# Patient Record
Sex: FEMALE | Race: BLACK OR AFRICAN AMERICAN | NOT HISPANIC OR LATINO | URBAN - METROPOLITAN AREA
[De-identification: names, ages, dates, MRNs, and addresses within clinical notes are randomized per-mention and may not be internally consistent; named-entity substitution may affect disease eponyms.]

---

## 2021-08-22 ENCOUNTER — EMERGENCY (EMERGENCY)
Facility: HOSPITAL | Age: 21
LOS: 0 days | Discharge: HOME | End: 2021-08-22
Attending: STUDENT IN AN ORGANIZED HEALTH CARE EDUCATION/TRAINING PROGRAM
Payer: COMMERCIAL

## 2021-08-22 VITALS
HEART RATE: 116 BPM | HEIGHT: 67 IN | WEIGHT: 130.07 LBS | SYSTOLIC BLOOD PRESSURE: 110 MMHG | OXYGEN SATURATION: 95 % | TEMPERATURE: 101 F | DIASTOLIC BLOOD PRESSURE: 74 MMHG | RESPIRATION RATE: 20 BRPM

## 2021-08-22 VITALS
RESPIRATION RATE: 18 BRPM | SYSTOLIC BLOOD PRESSURE: 99 MMHG | DIASTOLIC BLOOD PRESSURE: 60 MMHG | TEMPERATURE: 100 F | HEART RATE: 78 BPM | OXYGEN SATURATION: 98 %

## 2021-08-22 DIAGNOSIS — R07.9 CHEST PAIN, UNSPECIFIED: ICD-10-CM

## 2021-08-22 DIAGNOSIS — U07.1 COVID-19: ICD-10-CM

## 2021-08-22 LAB
ALBUMIN SERPL ELPH-MCNC: 3.5 G/DL — SIGNIFICANT CHANGE UP (ref 3.3–5)
ALP SERPL-CCNC: 46 U/L — SIGNIFICANT CHANGE UP (ref 40–120)
ALT FLD-CCNC: 14 U/L — SIGNIFICANT CHANGE UP (ref 12–78)
ANION GAP SERPL CALC-SCNC: 12 MMOL/L — SIGNIFICANT CHANGE UP (ref 5–17)
ANISOCYTOSIS BLD QL: SLIGHT — SIGNIFICANT CHANGE UP
AST SERPL-CCNC: 15 U/L — SIGNIFICANT CHANGE UP (ref 15–37)
BASOPHILS # BLD AUTO: 0 K/UL — SIGNIFICANT CHANGE UP (ref 0–0.2)
BASOPHILS NFR BLD AUTO: 0 % — SIGNIFICANT CHANGE UP (ref 0–2)
BILIRUB SERPL-MCNC: 0.7 MG/DL — SIGNIFICANT CHANGE UP (ref 0.2–1.2)
BUN SERPL-MCNC: 10 MG/DL — SIGNIFICANT CHANGE UP (ref 7–23)
CALCIUM SERPL-MCNC: 8.3 MG/DL — LOW (ref 8.5–10.1)
CHLORIDE SERPL-SCNC: 103 MMOL/L — SIGNIFICANT CHANGE UP (ref 96–108)
CO2 SERPL-SCNC: 22 MMOL/L — SIGNIFICANT CHANGE UP (ref 22–31)
CREAT SERPL-MCNC: 0.88 MG/DL — SIGNIFICANT CHANGE UP (ref 0.5–1.3)
EOSINOPHIL # BLD AUTO: 0 K/UL — SIGNIFICANT CHANGE UP (ref 0–0.5)
EOSINOPHIL NFR BLD AUTO: 0 % — SIGNIFICANT CHANGE UP (ref 0–6)
FLUAV AG NPH QL: SIGNIFICANT CHANGE UP
FLUBV AG NPH QL: SIGNIFICANT CHANGE UP
GLUCOSE SERPL-MCNC: 81 MG/DL — SIGNIFICANT CHANGE UP (ref 70–99)
HCG SERPL-ACNC: <1 MIU/ML — SIGNIFICANT CHANGE UP
HCT VFR BLD CALC: 36 % — SIGNIFICANT CHANGE UP (ref 34.5–45)
HGB BLD-MCNC: 11.5 G/DL — SIGNIFICANT CHANGE UP (ref 11.5–15.5)
HYPOCHROMIA BLD QL: SLIGHT — SIGNIFICANT CHANGE UP
LYMPHOCYTES # BLD AUTO: 0.77 K/UL — LOW (ref 1–3.3)
LYMPHOCYTES # BLD AUTO: 23 % — SIGNIFICANT CHANGE UP (ref 13–44)
MANUAL SMEAR VERIFICATION: SIGNIFICANT CHANGE UP
MCHC RBC-ENTMCNC: 25.4 PG — LOW (ref 27–34)
MCHC RBC-ENTMCNC: 31.9 GM/DL — LOW (ref 32–36)
MCV RBC AUTO: 79.6 FL — LOW (ref 80–100)
MICROCYTES BLD QL: SLIGHT — SIGNIFICANT CHANGE UP
MONOCYTES # BLD AUTO: 0.27 K/UL — SIGNIFICANT CHANGE UP (ref 0–0.9)
MONOCYTES NFR BLD AUTO: 8 % — SIGNIFICANT CHANGE UP (ref 2–14)
NEUTROPHILS # BLD AUTO: 2.32 K/UL — SIGNIFICANT CHANGE UP (ref 1.8–7.4)
NEUTROPHILS NFR BLD AUTO: 67 % — SIGNIFICANT CHANGE UP (ref 43–77)
NEUTS BAND # BLD: 2 % — SIGNIFICANT CHANGE UP (ref 0–8)
NRBC # BLD: 0 /100 — SIGNIFICANT CHANGE UP (ref 0–0)
NRBC # BLD: SIGNIFICANT CHANGE UP /100 WBCS (ref 0–0)
PLAT MORPH BLD: NORMAL — SIGNIFICANT CHANGE UP
PLATELET # BLD AUTO: 198 K/UL — SIGNIFICANT CHANGE UP (ref 150–400)
PLATELET CLUMP BLD QL SMEAR: ABNORMAL
POTASSIUM SERPL-MCNC: 3.5 MMOL/L — SIGNIFICANT CHANGE UP (ref 3.5–5.3)
POTASSIUM SERPL-SCNC: 3.5 MMOL/L — SIGNIFICANT CHANGE UP (ref 3.5–5.3)
PROT SERPL-MCNC: 7.9 GM/DL — SIGNIFICANT CHANGE UP (ref 6–8.3)
RBC # BLD: 4.52 M/UL — SIGNIFICANT CHANGE UP (ref 3.8–5.2)
RBC # FLD: 17.2 % — HIGH (ref 10.3–14.5)
RBC BLD AUTO: ABNORMAL
SARS-COV-2 RNA SPEC QL NAA+PROBE: DETECTED
SODIUM SERPL-SCNC: 137 MMOL/L — SIGNIFICANT CHANGE UP (ref 135–145)
TROPONIN I SERPL-MCNC: <.015 NG/ML — SIGNIFICANT CHANGE UP (ref 0.01–0.04)
WBC # BLD: 3.36 K/UL — LOW (ref 3.8–10.5)
WBC # FLD AUTO: 3.36 K/UL — LOW (ref 3.8–10.5)

## 2021-08-22 PROCEDURE — 93010 ELECTROCARDIOGRAM REPORT: CPT

## 2021-08-22 PROCEDURE — 71045 X-RAY EXAM CHEST 1 VIEW: CPT | Mod: 26

## 2021-08-22 PROCEDURE — 99285 EMERGENCY DEPT VISIT HI MDM: CPT

## 2021-08-22 RX ORDER — SODIUM CHLORIDE 9 MG/ML
1000 INJECTION, SOLUTION INTRAVENOUS ONCE
Refills: 0 | Status: COMPLETED | OUTPATIENT
Start: 2021-08-22 | End: 2021-08-22

## 2021-08-22 RX ORDER — ACETAMINOPHEN 500 MG
650 TABLET ORAL ONCE
Refills: 0 | Status: COMPLETED | OUTPATIENT
Start: 2021-08-22 | End: 2021-08-22

## 2021-08-22 RX ORDER — IBUPROFEN 200 MG
600 TABLET ORAL ONCE
Refills: 0 | Status: COMPLETED | OUTPATIENT
Start: 2021-08-22 | End: 2021-08-22

## 2021-08-22 RX ADMIN — Medication 600 MILLIGRAM(S): at 04:27

## 2021-08-22 RX ADMIN — Medication 650 MILLIGRAM(S): at 02:51

## 2021-08-22 RX ADMIN — Medication 650 MILLIGRAM(S): at 03:20

## 2021-08-22 RX ADMIN — SODIUM CHLORIDE 1000 MILLILITER(S): 9 INJECTION, SOLUTION INTRAVENOUS at 02:51

## 2021-08-22 NOTE — ED PROVIDER NOTE - PROGRESS NOTE DETAILS
covid (+), hemodynamically stable. educated on home treatment. I have discussed with the patient about the ED workup, lab results, diagnostics results, plan for discharge home, need for follow-up with primary care physician/specialists, and return precautions. At this time, the patient does not require further workup in the ED. The patient is subjectively feeling better and would like to be discharged home. The patient had the opportunity to ask questions and I have answered all inquiries. The patient verbalizes understanding and agreement with the plan. The patient is hemodynamically stable, clinically well-appearing, ambulatory, mentating well and ready for discharge home.

## 2021-08-22 NOTE — ED PROVIDER NOTE - CLINICAL SUMMARY MEDICAL DECISION MAKING FREE TEXT BOX
The patient does not have high-risk features of a life-threatening URI infection (COVID or otherwise).  There is no severe shortness of breath, light-headedness, or inability to perform that would indicated impending respiratory failure.  I have had a long conversation with the patient regarding the need for return to the ED:  worsening cough, shortness of breath, syncope, near-syncope, or any other concerns.  However, COVID swabs were sent given age and multiple risk factors, as well as high risk exposure. The patient is hemodynamically stable, mentating well, ambulatory w/o dyspnea, saturating >95% on RA and ready for d/c home.

## 2021-08-22 NOTE — ED ADULT NURSE NOTE - NSIMPLEMENTINTERV_GEN_ALL_ED
Implemented All Universal Safety Interventions:  [place] to call system. Call bell, personal items and telephone within reach. Instruct patient to call for assistance. Room bathroom lighting operational. Non-slip footwear when patient is off stretcher. Physically safe environment: no spills, clutter or unnecessary equipment. Stretcher in lowest position, wheels locked, appropriate side rails in place.

## 2021-08-22 NOTE — ED PROVIDER NOTE - PHYSICAL EXAMINATION
VITAL SIGNS: I have reviewed nursing notes and confirm.   GEN: Well-developed; well-nourished; in no acute distress. Speaking full sentences.  SKIN: Warm, pink, no rash, no diaphoresis, no cyanosis, well perfused.   HEAD: Normocephalic; atraumatic. No scalp lacerations, no abrasions.  NECK: Supple; non tender.   EYES: Pupils 3mm equal, round, reactive to light and accomodation, conjunctiva and sclera clear. Extra-ocular movements intact bilaterally.  ENT: No nasal discharge; airway clear. Trachea is midline. ORAL: No oropharyngeal exudates or erythema. Normal dentition.  CV: (+) Tachcyardia, S1, S2 normal; no murmurs, gallops, or rubs. No lower extremity pitting edema bilaterally. Capillary refill < 2 seconds throughout. Distal pulses intact 2+ throughout.  RESP: CTA bilaterally. No wheezes, rales, or rhonchi.   ABD: Normal bowel sounds, soft, non-distended, non-tender, no hepatosplenomegaly. No CVA tenderness bilaterally.  MSK: Normal range of motion and movement of all 4 extremities. No joint or muscular pain throughout. No clubbing.   BACK: No thoracolumbar midline or paravertebral tenderness. No step-offs or obvious deformities.  NEURO: Alert & oriented x 3, Grossly unremarkable. Sensory and motor intact throughout. No focal deficits. Gait: Fluid. Normal speech and coordination.   PSYCH: Cooperative, appropriate.

## 2021-08-22 NOTE — ED PROVIDER NOTE - OBJECTIVE STATEMENT
20F no pmhx presenting with chest pain x 2 days. Described as mid sternal chest wall pain, associated w/ non-productive cough, subjective fevers, nausea but no vomiting. Denies any shortness of breath, falls, trauma, syncope, headaches, neck pain, neck stiffness, abdominal pain, sick contacts, COVID vaccination, recent travel, recent surgery, immobility, extremity swelling, hemoptysis, hormone use, known personal cancer history, or personal/family history of blood clots.

## 2021-08-22 NOTE — ED ADULT NURSE NOTE - OBJECTIVE STATEMENT
Pt presents to the ED co chest pain, fevers. Pt states this has been going on for a few days. Denies sick contacts, pregnancy, and covid vaccine.

## 2021-08-22 NOTE — ED PROVIDER NOTE - PATIENT PORTAL LINK FT
You can access the FollowMyHealth Patient Portal offered by [facility] by registering at the following website: [email or web address]. By joining [employer]’s FollowMyHealth portal, you will also be able to view your health information using other applications (apps) compatible with our system.

## 2021-10-08 NOTE — ED PROVIDER NOTE - NSFOLLOWUPINSTRUCTIONS_ED_ALL_ED_FT
No fried, spicy or greasy foods. Increase fluids as tolerated  If your doctor prescribed narcotic pain medications after your procedure to help prevent and reduce constipation, increase fluid intake and fiber intake in your diet. You may take OTC Stool Softeners such as Colace to help reduce constipation.    DO NOT TAKE ANYTHING CONTAINING TYLENOL UNTIL AFTER 8:30PM    DO NOT TAKE CONTAINING ADVIL, MOTRIN, ALEVE OR NAPROXEN UNTIL AFTER 9PM
Rest, drink plenty of fluids.  Advance activity as tolerated.  Continue all previously prescribed medications as directed.  Follow up with your PMD 2-3 days and bring copies of your results.  Return to the ER for worsening symptoms, chest pain, shortness of breath, fainting, abdominal pain, or new concerning symptoms.    Take acetaminophen 650 mg orally every 6-8 hours for pain control as needed. Please do not exceed 4,000 mg of acetaminophen during a 24 hours period. Acetaminophen can be found in many over-the-counter cold medications as well as opioid medications that may be given for pain.    Take ibuprofen (also known as MOTRIN or ADVIL) 400 mg orally every 6-8 hours for pain control as needed with food to avoid an upset stomach. Ibuprofen can be found in many over-the-counter medications. Please do not take ibuprofen if you have a bleeding disorder, stomach or gastrointestinal ulcer, or liver disease.    If needed, you can alternate these medications so that you can take one medication every 3 hours. For example, at noon take ibuprofen, then at 3PM take acetaminophen, then at 6PM take ibuprofen.

## 2023-09-03 ENCOUNTER — EMERGENCY (EMERGENCY)
Facility: HOSPITAL | Age: 23
LOS: 0 days | Discharge: HOME | End: 2023-09-03
Attending: STUDENT IN AN ORGANIZED HEALTH CARE EDUCATION/TRAINING PROGRAM
Payer: COMMERCIAL

## 2023-09-03 VITALS
DIASTOLIC BLOOD PRESSURE: 70 MMHG | TEMPERATURE: 98 F | HEART RATE: 71 BPM | RESPIRATION RATE: 18 BRPM | SYSTOLIC BLOOD PRESSURE: 107 MMHG | OXYGEN SATURATION: 100 %

## 2023-09-03 VITALS
SYSTOLIC BLOOD PRESSURE: 118 MMHG | OXYGEN SATURATION: 98 % | HEART RATE: 100 BPM | WEIGHT: 130.07 LBS | TEMPERATURE: 98 F | DIASTOLIC BLOOD PRESSURE: 77 MMHG | RESPIRATION RATE: 20 BRPM | HEIGHT: 67 IN

## 2023-09-03 DIAGNOSIS — F41.0 PANIC DISORDER [EPISODIC PAROXYSMAL ANXIETY]: ICD-10-CM

## 2023-09-03 DIAGNOSIS — R11.2 NAUSEA WITH VOMITING, UNSPECIFIED: ICD-10-CM

## 2023-09-03 DIAGNOSIS — R10.30 LOWER ABDOMINAL PAIN, UNSPECIFIED: ICD-10-CM

## 2023-09-03 DIAGNOSIS — R10.9 UNSPECIFIED ABDOMINAL PAIN: ICD-10-CM

## 2023-09-03 LAB
ALBUMIN SERPL ELPH-MCNC: 3.9 G/DL — SIGNIFICANT CHANGE UP (ref 3.3–5)
ALP SERPL-CCNC: 61 U/L — SIGNIFICANT CHANGE UP (ref 40–120)
ALT FLD-CCNC: 32 U/L — SIGNIFICANT CHANGE UP (ref 12–78)
ANION GAP SERPL CALC-SCNC: 6 MMOL/L — SIGNIFICANT CHANGE UP (ref 5–17)
APPEARANCE UR: CLEAR — SIGNIFICANT CHANGE UP
AST SERPL-CCNC: 26 U/L — SIGNIFICANT CHANGE UP (ref 15–37)
BACTERIA # UR AUTO: ABNORMAL
BASOPHILS # BLD AUTO: 0.03 K/UL — SIGNIFICANT CHANGE UP (ref 0–0.2)
BASOPHILS NFR BLD AUTO: 0.3 % — SIGNIFICANT CHANGE UP (ref 0–2)
BILIRUB SERPL-MCNC: 0.8 MG/DL — SIGNIFICANT CHANGE UP (ref 0.2–1.2)
BILIRUB UR-MCNC: NEGATIVE — SIGNIFICANT CHANGE UP
BUN SERPL-MCNC: 9 MG/DL — SIGNIFICANT CHANGE UP (ref 7–23)
CALCIUM SERPL-MCNC: 9.4 MG/DL — SIGNIFICANT CHANGE UP (ref 8.5–10.1)
CHLORIDE SERPL-SCNC: 105 MMOL/L — SIGNIFICANT CHANGE UP (ref 96–108)
CO2 SERPL-SCNC: 28 MMOL/L — SIGNIFICANT CHANGE UP (ref 22–31)
COLOR SPEC: YELLOW — SIGNIFICANT CHANGE UP
CREAT SERPL-MCNC: 0.82 MG/DL — SIGNIFICANT CHANGE UP (ref 0.5–1.3)
DIFF PNL FLD: NEGATIVE — SIGNIFICANT CHANGE UP
EGFR: 104 ML/MIN/1.73M2 — SIGNIFICANT CHANGE UP
EOSINOPHIL # BLD AUTO: 0.01 K/UL — SIGNIFICANT CHANGE UP (ref 0–0.5)
EOSINOPHIL NFR BLD AUTO: 0.1 % — SIGNIFICANT CHANGE UP (ref 0–6)
EPI CELLS # UR: SIGNIFICANT CHANGE UP
GLUCOSE SERPL-MCNC: 93 MG/DL — SIGNIFICANT CHANGE UP (ref 70–99)
GLUCOSE UR QL: NEGATIVE MG/DL — SIGNIFICANT CHANGE UP
HCG SERPL-ACNC: <1 MIU/ML — SIGNIFICANT CHANGE UP
HCT VFR BLD CALC: 39.1 % — SIGNIFICANT CHANGE UP (ref 34.5–45)
HGB BLD-MCNC: 12.4 G/DL — SIGNIFICANT CHANGE UP (ref 11.5–15.5)
IMM GRANULOCYTES NFR BLD AUTO: 0.4 % — SIGNIFICANT CHANGE UP (ref 0–0.9)
KETONES UR-MCNC: NEGATIVE — SIGNIFICANT CHANGE UP
LEUKOCYTE ESTERASE UR-ACNC: ABNORMAL
LIDOCAIN IGE QN: 36 U/L — SIGNIFICANT CHANGE UP (ref 13–75)
LYMPHOCYTES # BLD AUTO: 1.15 K/UL — SIGNIFICANT CHANGE UP (ref 1–3.3)
LYMPHOCYTES # BLD AUTO: 11.8 % — LOW (ref 13–44)
MCHC RBC-ENTMCNC: 26.7 PG — LOW (ref 27–34)
MCHC RBC-ENTMCNC: 31.7 G/DL — LOW (ref 32–36)
MCV RBC AUTO: 84.1 FL — SIGNIFICANT CHANGE UP (ref 80–100)
MONOCYTES # BLD AUTO: 0.33 K/UL — SIGNIFICANT CHANGE UP (ref 0–0.9)
MONOCYTES NFR BLD AUTO: 3.4 % — SIGNIFICANT CHANGE UP (ref 2–14)
NEUTROPHILS # BLD AUTO: 8.18 K/UL — HIGH (ref 1.8–7.4)
NEUTROPHILS NFR BLD AUTO: 84 % — HIGH (ref 43–77)
NITRITE UR-MCNC: NEGATIVE — SIGNIFICANT CHANGE UP
NRBC # BLD: 0 /100 WBCS — SIGNIFICANT CHANGE UP (ref 0–0)
PH UR: 8 — SIGNIFICANT CHANGE UP (ref 5–8)
PLATELET # BLD AUTO: 238 K/UL — SIGNIFICANT CHANGE UP (ref 150–400)
POTASSIUM SERPL-MCNC: 3.8 MMOL/L — SIGNIFICANT CHANGE UP (ref 3.5–5.3)
POTASSIUM SERPL-SCNC: 3.8 MMOL/L — SIGNIFICANT CHANGE UP (ref 3.5–5.3)
PROT SERPL-MCNC: 8.1 GM/DL — SIGNIFICANT CHANGE UP (ref 6–8.3)
PROT UR-MCNC: NEGATIVE MG/DL — SIGNIFICANT CHANGE UP
RBC # BLD: 4.65 M/UL — SIGNIFICANT CHANGE UP (ref 3.8–5.2)
RBC # FLD: 16.8 % — HIGH (ref 10.3–14.5)
RBC CASTS # UR COMP ASSIST: SIGNIFICANT CHANGE UP /HPF (ref 0–4)
SODIUM SERPL-SCNC: 139 MMOL/L — SIGNIFICANT CHANGE UP (ref 135–145)
SP GR SPEC: 1.01 — SIGNIFICANT CHANGE UP (ref 1.01–1.02)
UROBILINOGEN FLD QL: NEGATIVE MG/DL — SIGNIFICANT CHANGE UP
WBC # BLD: 9.74 K/UL — SIGNIFICANT CHANGE UP (ref 3.8–10.5)
WBC # FLD AUTO: 9.74 K/UL — SIGNIFICANT CHANGE UP (ref 3.8–10.5)
WBC UR QL: SIGNIFICANT CHANGE UP

## 2023-09-03 PROCEDURE — 74177 CT ABD & PELVIS W/CONTRAST: CPT | Mod: 26,MA

## 2023-09-03 PROCEDURE — 99285 EMERGENCY DEPT VISIT HI MDM: CPT

## 2023-09-03 RX ORDER — SODIUM CHLORIDE 9 MG/ML
1000 INJECTION INTRAMUSCULAR; INTRAVENOUS; SUBCUTANEOUS ONCE
Refills: 0 | Status: COMPLETED | OUTPATIENT
Start: 2023-09-03 | End: 2023-09-03

## 2023-09-03 RX ORDER — ACETAMINOPHEN 500 MG
975 TABLET ORAL ONCE
Refills: 0 | Status: COMPLETED | OUTPATIENT
Start: 2023-09-03 | End: 2023-09-03

## 2023-09-03 RX ORDER — IOHEXOL 300 MG/ML
30 INJECTION, SOLUTION INTRAVENOUS ONCE
Refills: 0 | Status: COMPLETED | OUTPATIENT
Start: 2023-09-03 | End: 2023-09-03

## 2023-09-03 RX ADMIN — SODIUM CHLORIDE 1000 MILLILITER(S): 9 INJECTION INTRAMUSCULAR; INTRAVENOUS; SUBCUTANEOUS at 18:43

## 2023-09-03 RX ADMIN — Medication 975 MILLIGRAM(S): at 18:42

## 2023-09-03 RX ADMIN — IOHEXOL 30 MILLILITER(S): 300 INJECTION, SOLUTION INTRAVENOUS at 18:42

## 2023-09-03 NOTE — ED PROVIDER NOTE - PATIENT PORTAL LINK FT
You can access the FollowMyHealth Patient Portal offered by [facility] by registering at the following website: [email or web address]. By joining [employer]’s FollowMyHealth portal, you will also be able to view your health information using other applications (apps) compatible with our system.

## 2023-09-03 NOTE — ED PROVIDER NOTE - OBJECTIVE STATEMENT
22-year-old female with no reported significant past medical history presenting to the ED with multiple medical complaints, reports having a panic attack felt very anxious also reports abdominal pain and nausea and vomiting earlier today, patient denies any current symptoms of chest pain shortness of breath, no other reported complaints..

## 2023-09-03 NOTE — ED ADULT TRIAGE NOTE - CHIEF COMPLAINT QUOTE
pt states " I'm having a panic attack. " pt is shaking at triage. very anxious. c/o shortness of breath, headache and stiffness in her hands. history of anxiety.

## 2023-09-03 NOTE — ED ADULT NURSE NOTE - NSFALLUNIVINTERV_ED_ALL_ED
Bed/Stretcher in lowest position, wheels locked, appropriate side rails in place/Call bell, personal items and telephone in reach/Instruct patient to call for assistance before getting out of bed/chair/stretcher/Non-slip footwear applied when patient is off stretcher/[place] to call system/Physically safe environment - no spills, clutter or unnecessary equipment/Purposeful proactive rounding/Room/bathroom lighting operational, light cord in reach

## 2023-09-03 NOTE — ED PROVIDER NOTE - NSFOLLOWUPCLINICS_GEN_ALL_ED_FT
[facility]  Psychiatry  [address]  [place] [address]  Phone: [phone]  Fax:   Follow Up Time: 7-10 Days    [facility]  Psychiatry  [address] [place] [address]  Phone: [phone]  Fax:   Follow Up Time: 7-10 Days

## 2023-09-03 NOTE — ED PROVIDER NOTE - CLINICAL SUMMARY MEDICAL DECISION MAKING FREE TEXT BOX
22-year-old female with no reported significant past medical history presenting to the ED with multiple medical complaints, reports having a panic attack felt very anxious also reports abdominal pain and nausea and vomiting earlier today, patient denies any current symptoms of chest pain shortness of breath, no other reported complaints..    ct abd/pelvis, labs, ua 22-year-old female with no reported significant past medical history presenting to the ED with multiple medical complaints, reports having a panic attack felt very anxious also reports abdominal pain and nausea and vomiting earlier today, patient denies any current symptoms of chest pain shortness of breath, no other reported complaints..    ct abd/pelvis, labs, ua  no acute pathology  f/u psychiatry

## 2023-09-03 NOTE — ED ADULT NURSE NOTE - OBJECTIVE STATEMENT
patient A&Ox3 in no acute distress c/o of headache with nausea and vomiting for 3 days , zeeant cough no fever at this time , no chest pain no difficulty breathing, patient also c/o of anxiety

## 2023-09-03 NOTE — ED PROVIDER NOTE - PHYSICAL EXAMINATION
VITAL SIGNS: I have reviewed nursing notes and confirm.  CONSTITUTIONAL: well-appearing, non-toxic, NAD  SKIN: Warm dry, normal skin turgor  HEAD: NCAT  EYES: EOMI, PERRLA, no scleral icterus  ENT: Moist mucous membranes, normal pharynx with no erythema or exudates  NECK: Supple; non tender. Full ROM. No cervical LAD  CARD: RRR, no murmurs, rubs or gallops  RESP: clear to ausculation b/l.  No rales, rhonchi, or wheezing.  ABD: soft, + BS lower abdominal ttp non-distended, no rebound or guarding. No CVA tenderness  EXT: Full ROM, no bony tenderness, no pedal edema, no calf tenderness  PSYCH: Cooperative, appropriate.

## 2024-09-18 ENCOUNTER — EMERGENCY (EMERGENCY)
Facility: HOSPITAL | Age: 24
LOS: 0 days | Discharge: HOME | End: 2024-09-18
Attending: EMERGENCY MEDICINE
Payer: COMMERCIAL

## 2024-09-18 VITALS
TEMPERATURE: 98 F | OXYGEN SATURATION: 100 % | DIASTOLIC BLOOD PRESSURE: 75 MMHG | WEIGHT: 145.06 LBS | HEART RATE: 89 BPM | SYSTOLIC BLOOD PRESSURE: 110 MMHG | HEIGHT: 67 IN | RESPIRATION RATE: 16 BRPM

## 2024-09-18 VITALS
HEART RATE: 72 BPM | RESPIRATION RATE: 18 BRPM | DIASTOLIC BLOOD PRESSURE: 79 MMHG | TEMPERATURE: 98 F | OXYGEN SATURATION: 100 % | SYSTOLIC BLOOD PRESSURE: 118 MMHG
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
## 2024-09-18 DIAGNOSIS — M54.50 LOW BACK PAIN, UNSPECIFIED: ICD-10-CM
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
## 2024-09-18 DIAGNOSIS — M54.6 PAIN IN THORACIC SPINE: ICD-10-CM

## 2024-09-18 PROBLEM — F41.9 ANXIETY DISORDER, UNSPECIFIED: Chronic | Status: ACTIVE | Noted: 2023-09-03

## 2024-09-18 LAB — HCG UR QL: NEGATIVE — SIGNIFICANT CHANGE UP

## 2024-09-18 PROCEDURE — 99284 EMERGENCY DEPT VISIT MOD MDM: CPT

## 2024-09-18 PROCEDURE — 72020 X-RAY EXAM OF SPINE 1 VIEW: CPT | Mod: 26

## 2024-09-18 PROCEDURE — 71046 X-RAY EXAM CHEST 2 VIEWS: CPT | Mod: 26

## 2024-09-18 RX ORDER — IBUPROFEN 600 MG
600 TABLET ORAL ONCE
Refills: 0 | Status: COMPLETED | OUTPATIENT
Start: 2024-09-18 | End: 2024-09-18

## 2024-09-18 RX ADMIN — Medication 600 MILLIGRAM(S): at 22:09

## 2024-09-18 NOTE — ED PROVIDER NOTE - PROGRESS NOTE DETAILS
MARITA Patel: Workup reviewed - XRAY's w/ no acute pathology noted. Results endorsed including unexpected incidental findings (copy of reports provided to patient). Shared Decision Making - Reassessment performed, pt able to ambulate in ED without any difficulty, pt requesting Motrin since she was initially offered Tylenol but didn't want it at that time. Patient is medically stable for discharge. Strict return precautions given, discussed red flag signs/symptoms. Patient to follow up with PMD, Ortho prn. Patient/parent displays understanding and agreeable with plan, comfortable with discharge plan home. Plan for discharge discussed with Dr. Mancilla who agrees with disposition and discharge plan.
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
## 2024-09-18 NOTE — ED PROVIDER NOTE - NSFOLLOWUPINSTRUCTIONS_ED_ALL_ED_FT
- Follow-up with Orthopedics as needed for persistent back pain.  - Follow-up with your Primary Care Physician within the next week.    Medications  - Take Tylenol (Acetaminophen) 650 mg every 4-6 hours AND/OR Motrin (Ibuprofen) 600 mg every 6-8 hours as needed for pain/fever.    Advance activity as tolerated.  Continue all previously prescribed medications as directed unless otherwise instructed.  Follow up with your primary care physician in 48-72 hours- bring copies of your results.  Return to the ER for worsening or persistent symptoms, and/or ANY NEW OR CONCERNING SYMPTOMS such as fever, chest pain, shortness of breath, abdominal pain, or headaches. If you have issues obtaining follow up, please call: [phone] to obtain a doctor or specialist who takes your insurance in your area.  You may call [phone] to make an appointment with the internal medicine clinic.    Acute Back Pain, Adult    Acute back pain is sudden and usually short-lived. It is often caused by an injury to the muscles and tissues in the back. The injury may result from:    A muscle or ligament getting overstretched or torn (strained). Ligaments are tissues that connect bones to each other. Lifting something improperly can cause a back strain.  Wear and tear (degeneration) of the spinal disks. Spinal disks are circular tissue that provide cushioning between the bones of the spine (vertebrae).  Twisting motions, such as while playing sports or doing yard work.  A hit to the back.  Arthritis.    You may have a physical exam, lab tests, and imaging tests to find the cause of your pain. Acute back pain usually goes away with rest and home care.    Follow these instructions at home:      Managing pain, stiffness, and swelling    Treatment may include medicines for pain and inflammation that are taken by mouth or applied to the skin, prescription pain medicine, or muscle relaxants. Take over-the-counter and prescription medicines only as told by your health care provider.  Your health care provider may recommend applying ice during the first 24–48 hours after your pain starts. To do this:    Put ice in a plastic bag.  Place a towel between your skin and the bag.  Leave the ice on for 20 minutes, 2–3 times a day.  If directed, apply heat to the affected area as often as told by your health care provider. Use the heat source that your health care provider recommends, such as a moist heat pack or a heating pad.    Place a towel between your skin and the heat source.  Leave the heat on for 20–30 minutes.  Remove the heat if your skin turns bright red. This is especially important if you are unable to feel pain, heat, or cold. You have a greater risk of getting burned.        Activity     Do not stay in bed. Staying in bed for more than 1–2 days can delay your recovery.  Sit up and stand up straight. Avoid leaning forward when you sit or hunching over when you stand.    If you work at a desk, sit close to it so you do not need to lean over. Keep your chin tucked in. Keep your neck drawn back, and keep your elbows bent at a 90-degree angle (right angle).  Sit high and close to the steering wheel when you drive. Add lower back (lumbar) support to your car seat, if needed.  Take short walks on even surfaces as soon as you are able. Try to increase the length of time you walk each day.  Do not sit, drive, or  one place for more than 30 minutes at a time. Sitting or standing for long periods of time can put stress on your back.  Do not drive or use heavy machinery while taking prescription pain medicine.  Use proper lifting techniques. When you bend and lift, use positions that put less stress on your back:    Bend your knees.  Keep the load close to your body.  Avoid twisting.  Exercise regularly as told by your health care provider. Exercising helps your back heal faster and helps prevent back injuries by keeping muscles strong and flexible.  Work with a physical therapist to make a safe exercise program, as recommended by your health care provider. Do any exercises as told by your physical therapist.        Lifestyle    Maintain a healthy weight. Extra weight puts stress on your back and makes it difficult to have good posture.  Avoid activities or situations that make you feel anxious or stressed. Stress and anxiety increase muscle tension and can make back pain worse. Learn ways to manage anxiety and stress, such as through exercise.        General instructions    Sleep on a firm mattress in a comfortable position. Try lying on your side with your knees slightly bent. If you lie on your back, put a pillow under your knees.  Follow your treatment plan as told by your health care provider. This may include:    Cognitive or behavioral therapy.  Acupuncture or massage therapy.  Meditation or yoga.    Contact a health care provider if:  You have pain that is not relieved with rest or medicine.  You have increasing pain going down into your legs or buttocks.  Your pain does not improve after 2 weeks.  You have pain at night.  You lose weight without trying.  You have a fever or chills.    Get help right away if:  You develop new bowel or bladder control problems.  You have unusual weakness or numbness in your arms or legs.  You develop nausea or vomiting.  You develop abdominal pain.  You feel faint.    Summary  Acute back pain is sudden and usually short-lived.  Use proper lifting techniques. When you bend and lift, use positions that put less stress on your back.  Take over-the-counter and prescription medicines and apply heat or ice as directed by your health care provider.    ADDITIONAL NOTES AND INSTRUCTIONS    Please follow up with your Primary MD in 24-48 hr.  Seek immediate medical care for any new/worsening signs or symptoms.
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
## 2024-09-18 NOTE — ED PROVIDER NOTE - NSFOLLOWUPCLINICS_GEN_ALL_ED_FT
[facility] Orthopedic Surgery  Orthopedic Surgery  [address], 3rd & 4th [place] [address]  Phone: [phone]  Fax:     [facility]  Orthopedic Surgery  [address] [address]  Phone: [phone]  Fax:     [facility]  Orthopedic Surgery  [address] [place] [address]  Phone: [phone]  Fax:
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
## 2024-09-18 NOTE — ED PROVIDER NOTE - ENMT, MLM
Airway patent, trachea midline.  Nasal mucosa clear. Mouth with normal mucosa. Throat has no vesicles, no oropharyngeal exudates and uvula is midline.

## 2024-09-18 NOTE — ED PROVIDER NOTE - NS ED ATTENDING STATEMENT MOD
0730: Bedside shift change report given to Jose Bower (oncoming nurse) by Luis F Barry RN (offgoing nurse). Report included the following information SBAR and Kardex. 1200: Bedside shift change report given to [address] [place] [address] (oncoming nurse) by Janee Cruz RN (offgoing nurse). Report included the following information SBAR and Kardex. Problem: Falls - Risk of  Goal: *Absence of Falls  Description: Document Tammi Ovalle Fall Risk and appropriate interventions in the flowsheet. Outcome: Progressing Towards Goal  Note: Fall Risk Interventions:  Mobility Interventions: Communicate number of staff needed for ambulation/transfer         Medication Interventions: Evaluate medications/consider consulting pharmacy, Patient to call before getting OOB    Elimination Interventions: Call light in reach              Problem: Pressure Injury - Risk of  Goal: *Prevention of pressure injury  Description: Document Kasi Scale and appropriate interventions in the flowsheet. Outcome: Progressing Towards Goal  Note: Pressure Injury Interventions:  Sensory Interventions: Assess changes in LOC, Discuss PT/OT consult with provider, Keep linens dry and wrinkle-free, Maintain/enhance activity level, Minimize linen layers, Pressure redistribution bed/mattress (bed type), Turn and reposition approx.  every two hours (pillows and wedges if needed)         Activity Interventions: Increase time out of bed, Pressure redistribution bed/mattress(bed type), PT/OT evaluation    Mobility Interventions: HOB 30 degrees or less, Pressure redistribution bed/mattress (bed type), PT/OT evaluation    Nutrition Interventions: Document food/fluid/supplement intake    Friction and Shear Interventions: Foam dressings/transparent film/skin sealants, Lift sheet, Minimize layers This was a shared visit with the HEIDI. I reviewed and verified the documentation.

## 2024-09-18 NOTE — ED PROVIDER NOTE - PATIENT PORTAL LINK FT
You can access the FollowMyHealth Patient Portal offered by [facility] by registering at the following website: [email or web address]. By joining [employer]’s FollowMyHealth portal, you will also be able to view your health information using other applications (apps) compatible with our system.
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
## 2024-09-18 NOTE — ED PROVIDER NOTE - CHPI ED SYMPTOMS NEG
Addended by: ANDRE DAVENPORT on: 4/26/2023 06:14 PM     Modules accepted: Orders    
no difficulty bearing weight

## 2024-09-18 NOTE — ED ADULT NURSE NOTE - NURSING MUSC ROM
What Is The Reason For Today's Visit?: Skin Cancer Screening Exam What Is The Reason For Today's Visit? (Being Monitored For X): surveillance against the recurrence of atypical nevi How Severe Are Your Spot(S)?: mild full range of motion in all extremities

## 2024-09-18 NOTE — ED PROVIDER NOTE - ATTENDING APP SHARED VISIT CONTRIBUTION OF CARE
I performed the initial evaluation of this patient and determined the plan of care for this patient.

## 2024-09-18 NOTE — ED PROVIDER NOTE - MUSCULOSKELETAL, MLM
Spine appears normal, there is no cervical spinal tenderness but there is lower thoracic and upper lumbar spinal tenderness. Pelvis stable. range of motion is not limited, no muscle or joint tenderness

## 2024-09-18 NOTE — ED ADULT NURSE NOTE - CAS EDN DISCHARGE INTERVENTIONS
CV: S1,S2  Pulmonary: Clear to auscultation bilaterally    Abdomen: Gravid abdomen  Extremities: Nontender to palpation bilaterally     EFM: 120hr, moderate variability, +accelerations, -decelerations   TOCO: Uterine Irritability   SVE: 1/70/-3 per Dr. Lopez   BSUS: Vertex
n/a

## 2025-05-16 NOTE — ED ADULT NURSE NOTE - FINAL NURSING ELECTRONIC SIGNATURE
Form fax back pcp responsed.  
Please ask the nurse at [facility] to send me the patient glucose readings.  Form is ready to fax  
Received fax from from [facility] to have PCP review medication review report and make any updates if needed.     Fax left on PCP desk for review and to sign   
03-Sep-2023 20:28

## 2025-06-05 ENCOUNTER — TRANSCRIPTION ENCOUNTER (OUTPATIENT)
Age: 25
End: 2025-06-05

## 2025-06-05 ENCOUNTER — OUTPATIENT (OUTPATIENT)
Dept: EMERGENCY DEPT | Facility: HOSPITAL | Age: 25
LOS: 1 days | End: 2025-06-05

## 2025-06-05 ENCOUNTER — RESULT REVIEW (OUTPATIENT)
Age: 25
End: 2025-06-05

## 2025-06-05 VITALS
DIASTOLIC BLOOD PRESSURE: 73 MMHG | SYSTOLIC BLOOD PRESSURE: 129 MMHG | OXYGEN SATURATION: 100 % | WEIGHT: 115.96 LBS | HEART RATE: 92 BPM | TEMPERATURE: 98 F | RESPIRATION RATE: 18 BRPM

## 2025-06-05 VITALS
OXYGEN SATURATION: 99 % | HEART RATE: 71 BPM | TEMPERATURE: 98 F | DIASTOLIC BLOOD PRESSURE: 76 MMHG | RESPIRATION RATE: 18 BRPM | SYSTOLIC BLOOD PRESSURE: 116 MMHG

## 2025-06-05 DIAGNOSIS — O00.90 UNSPECIFIED ECTOPIC PREGNANCY WITHOUT INTRAUTERINE PREGNANCY: ICD-10-CM

## 2025-06-05 LAB
ADD ON TEST-SPECIMEN IN LAB: SIGNIFICANT CHANGE UP
ALBUMIN SERPL ELPH-MCNC: 4.2 G/DL — SIGNIFICANT CHANGE UP (ref 3.3–5)
ALP SERPL-CCNC: 62 U/L — SIGNIFICANT CHANGE UP (ref 40–120)
ALT FLD-CCNC: 15 U/L — SIGNIFICANT CHANGE UP (ref 4–33)
ANION GAP SERPL CALC-SCNC: 12 MMOL/L — SIGNIFICANT CHANGE UP (ref 7–14)
APTT BLD: 28.1 SEC — SIGNIFICANT CHANGE UP (ref 26.1–36.8)
AST SERPL-CCNC: 17 U/L — SIGNIFICANT CHANGE UP (ref 4–32)
BASOPHILS # BLD AUTO: 0.03 K/UL — SIGNIFICANT CHANGE UP (ref 0–0.2)
BASOPHILS NFR BLD AUTO: 0.8 % — SIGNIFICANT CHANGE UP (ref 0–2)
BILIRUB SERPL-MCNC: 0.9 MG/DL — SIGNIFICANT CHANGE UP (ref 0.2–1.2)
BLD GP AB SCN SERPL QL: NEGATIVE — SIGNIFICANT CHANGE UP
BUN SERPL-MCNC: 4 MG/DL — LOW (ref 7–23)
CALCIUM SERPL-MCNC: 8.9 MG/DL — SIGNIFICANT CHANGE UP (ref 8.4–10.5)
CHLORIDE SERPL-SCNC: 104 MMOL/L — SIGNIFICANT CHANGE UP (ref 98–107)
CO2 SERPL-SCNC: 23 MMOL/L — SIGNIFICANT CHANGE UP (ref 22–31)
CREAT SERPL-MCNC: 0.69 MG/DL — SIGNIFICANT CHANGE UP (ref 0.5–1.3)
EGFR: 125 ML/MIN/1.73M2 — SIGNIFICANT CHANGE UP
EGFR: 125 ML/MIN/1.73M2 — SIGNIFICANT CHANGE UP
EOSINOPHIL # BLD AUTO: 0.08 K/UL — SIGNIFICANT CHANGE UP (ref 0–0.5)
EOSINOPHIL NFR BLD AUTO: 2.2 % — SIGNIFICANT CHANGE UP (ref 0–6)
GLUCOSE SERPL-MCNC: 87 MG/DL — SIGNIFICANT CHANGE UP (ref 70–99)
HCG SERPL-ACNC: 9174 MIU/ML — SIGNIFICANT CHANGE UP
HCT VFR BLD CALC: 35.3 % — SIGNIFICANT CHANGE UP (ref 34.5–45)
HGB BLD-MCNC: 11.1 G/DL — LOW (ref 11.5–15.5)
IANC: 1.98 K/UL — SIGNIFICANT CHANGE UP (ref 1.8–7.4)
IMM GRANULOCYTES NFR BLD AUTO: 0.3 % — SIGNIFICANT CHANGE UP (ref 0–0.9)
INR BLD: 1.01 RATIO — SIGNIFICANT CHANGE UP (ref 0.85–1.16)
LYMPHOCYTES # BLD AUTO: 1.34 K/UL — SIGNIFICANT CHANGE UP (ref 1–3.3)
LYMPHOCYTES # BLD AUTO: 36.4 % — SIGNIFICANT CHANGE UP (ref 13–44)
MCHC RBC-ENTMCNC: 26.7 PG — LOW (ref 27–34)
MCHC RBC-ENTMCNC: 31.4 G/DL — LOW (ref 32–36)
MCV RBC AUTO: 84.9 FL — SIGNIFICANT CHANGE UP (ref 80–100)
MONOCYTES # BLD AUTO: 0.24 K/UL — SIGNIFICANT CHANGE UP (ref 0–0.9)
MONOCYTES NFR BLD AUTO: 6.5 % — SIGNIFICANT CHANGE UP (ref 2–14)
NEUTROPHILS # BLD AUTO: 1.98 K/UL — SIGNIFICANT CHANGE UP (ref 1.8–7.4)
NEUTROPHILS NFR BLD AUTO: 53.8 % — SIGNIFICANT CHANGE UP (ref 43–77)
NRBC # BLD AUTO: 0 K/UL — SIGNIFICANT CHANGE UP (ref 0–0)
NRBC # FLD: 0 K/UL — SIGNIFICANT CHANGE UP (ref 0–0)
NRBC BLD AUTO-RTO: 0 /100 WBCS — SIGNIFICANT CHANGE UP (ref 0–0)
PLATELET # BLD AUTO: 331 K/UL — SIGNIFICANT CHANGE UP (ref 150–400)
POTASSIUM SERPL-MCNC: 3.7 MMOL/L — SIGNIFICANT CHANGE UP (ref 3.5–5.3)
POTASSIUM SERPL-SCNC: 3.7 MMOL/L — SIGNIFICANT CHANGE UP (ref 3.5–5.3)
PROT SERPL-MCNC: 7.6 G/DL — SIGNIFICANT CHANGE UP (ref 6–8.3)
PROTHROM AB SERPL-ACNC: 11.7 SEC — SIGNIFICANT CHANGE UP (ref 9.9–13.4)
RBC # BLD: 4.16 M/UL — SIGNIFICANT CHANGE UP (ref 3.8–5.2)
RBC # FLD: 15.7 % — HIGH (ref 10.3–14.5)
RH IG SCN BLD-IMP: POSITIVE — SIGNIFICANT CHANGE UP
RH IG SCN BLD-IMP: POSITIVE — SIGNIFICANT CHANGE UP
SODIUM SERPL-SCNC: 139 MMOL/L — SIGNIFICANT CHANGE UP (ref 135–145)
WBC # BLD: 3.68 K/UL — LOW (ref 3.8–10.5)
WBC # FLD AUTO: 3.68 K/UL — LOW (ref 3.8–10.5)

## 2025-06-05 PROCEDURE — 99285 EMERGENCY DEPT VISIT HI MDM: CPT

## 2025-06-05 PROCEDURE — 76817 TRANSVAGINAL US OBSTETRIC: CPT | Mod: 26

## 2025-06-05 DEVICE — VISTASEAL FIBRIN HUMAN 10ML
Type: IMPLANTABLE DEVICE | Site: RIGHT | Status: NON-FUNCTIONAL
Removed: 2025-06-05

## 2025-06-05 RX ORDER — SODIUM CHLORIDE 9 G/1000ML
1000 INJECTION, SOLUTION INTRAVENOUS
Refills: 0 | Status: DISCONTINUED | OUTPATIENT
Start: 2025-06-05 | End: 2025-06-05

## 2025-06-05 RX ORDER — ACETAMINOPHEN 500 MG/5ML
1000 LIQUID (ML) ORAL ONCE
Refills: 0 | Status: COMPLETED | OUTPATIENT
Start: 2025-06-05 | End: 2025-06-05

## 2025-06-05 RX ORDER — OXYCODONE HYDROCHLORIDE 30 MG/1
5 TABLET ORAL ONCE
Refills: 0 | Status: DISCONTINUED | OUTPATIENT
Start: 2025-06-05 | End: 2025-06-05

## 2025-06-05 RX ORDER — HYDROMORPHONE/SOD CHLOR,ISO/PF 2 MG/10 ML
0.5 SYRINGE (ML) INJECTION
Refills: 0 | Status: DISCONTINUED | OUTPATIENT
Start: 2025-06-05 | End: 2025-06-05

## 2025-06-05 RX ORDER — OXYCODONE HYDROCHLORIDE 30 MG/1
1 TABLET ORAL
Qty: 5 | Refills: 0
Start: 2025-06-05

## 2025-06-05 RX ORDER — ONDANSETRON HCL/PF 4 MG/2 ML
4 VIAL (ML) INJECTION ONCE
Refills: 0 | Status: DISCONTINUED | OUTPATIENT
Start: 2025-06-05 | End: 2025-06-05

## 2025-06-05 RX ORDER — HYDROMORPHONE/SOD CHLOR,ISO/PF 2 MG/10 ML
0.25 SYRINGE (ML) INJECTION
Refills: 0 | Status: DISCONTINUED | OUTPATIENT
Start: 2025-06-05 | End: 2025-06-05

## 2025-06-05 RX ORDER — FENTANYL CITRATE-0.9 % NACL/PF 100MCG/2ML
25 SYRINGE (ML) INTRAVENOUS
Refills: 0 | Status: DISCONTINUED | OUTPATIENT
Start: 2025-06-05 | End: 2025-06-05

## 2025-06-05 RX ORDER — ONDANSETRON HCL/PF 4 MG/2 ML
4 VIAL (ML) INJECTION ONCE
Refills: 0 | Status: COMPLETED | OUTPATIENT
Start: 2025-06-05 | End: 2025-06-05

## 2025-06-05 RX ADMIN — OXYCODONE HYDROCHLORIDE 5 MILLIGRAM(S): 30 TABLET ORAL at 21:52

## 2025-06-05 RX ADMIN — Medication 400 MILLIGRAM(S): at 11:55

## 2025-06-05 RX ADMIN — OXYCODONE HYDROCHLORIDE 5 MILLIGRAM(S): 30 TABLET ORAL at 22:20

## 2025-06-05 RX ADMIN — SODIUM CHLORIDE 125 MILLILITER(S): 9 INJECTION, SOLUTION INTRAVENOUS at 16:17

## 2025-06-05 RX ADMIN — Medication 0.5 MILLIGRAM(S): at 21:15

## 2025-06-05 RX ADMIN — Medication 4 MILLIGRAM(S): at 21:52

## 2025-06-05 RX ADMIN — SODIUM CHLORIDE 125 MILLILITER(S): 9 INJECTION, SOLUTION INTRAVENOUS at 20:30

## 2025-06-05 RX ADMIN — Medication 0.5 MILLIGRAM(S): at 21:30

## 2025-06-05 NOTE — ED ADULT TRIAGE NOTE - CHIEF COMPLAINT QUOTE
pt c/o vaginal bleeding and was told she has an ectopic pregnancy. Endorses lower right abdominal pain with light bright red bleeding. Pt endorse 1 viable previous pregnancy. LMP 5/13/25. HCG level of 10,335. Pt denies receiving any medication. Pt hx of anxiety. denies chest pain, SOB, headaches, or dizziness.

## 2025-06-05 NOTE — ASU DISCHARGE PLAN (ADULT/PEDIATRIC) - ASU DC SPECIAL INSTRUCTIONSFT
Postoperative Instructions      Pain control     For pain control, take the followin. Motrin 600mg four times a day, take with food  2. Add Tylenol 650 four times a day, alternated with motrin  3. Add oxycodone as needed for severe pain not managed well by motrin and tylenol. A prescription has been sent to your pharmacy on file.     Motrin and Tylenol can be obtained over the counter.    Postoperative restrictions   Do not drive or make important decisions for 24 hours after anesthesia. You may feel drowsy for 24 hours and should have a responsible adult with you during that time. Nothing in the vagina (tampons, sexual intercourse), No tub baths, pools or hot tubs for 6 weeks (showers are ok!). No lifting anything heavier than 15 lbs, no strenuous exercise for 6 weeks after surgery. Do not pull or cut any stitches that you see around your incision.         Vaginal bleeding   Spotting per vagina is normal in first few days after surgery. If you are soaking 1 pad per hour, that is not normal and you should notify your doctor's office and seek medical attention right away.        Signs of Infection  Some postoperative pain and discomfort is normal. However, if you experience any of the following, you may be developing an infection and should be seen by your doctor: pain that does not get better with the oral medications listed above, fever greater than 100.4F, foul smelling discharge coming from the surgical site, nausea and vomiting that does not stop (especially if you are unable to tolerate oral intake), or inability to urinate. If you experience any of these symptoms, call your doctor's office to be seen right away.    Follow Up  Call your doctor's office to schedule a postoperative appointment in 2 weeks. The results from the procedure will be discussed with you at that time. Postoperative Instructions      Pain control     For pain control, take the followin. Motrin 600mg four times a day, take with food  2. Add Tylenol 650 four times a day, alternated with motrin  3. Add oxycodone as needed for severe pain not managed well by motrin and tylenol. A prescription has been sent to your pharmacy on file.     Motrin and Tylenol can be obtained over the counter.    Postoperative restrictions   Do not drive or make important decisions for 24 hours after anesthesia. You may feel drowsy for 24 hours and should have a responsible adult with you during that time. Nothing in the vagina (tampons, sexual intercourse), No tub baths, pools or hot tubs for 6 weeks (showers are ok!). No lifting anything heavier than 15 lbs, no strenuous exercise for 6 weeks after surgery. Do not pull or cut any stitches that you see around your incision.         Vaginal bleeding   Spotting per vagina is normal in first few days after surgery. If you are soaking 1 pad per hour, that is not normal and you should notify your doctor's office and seek medical attention right away.        Signs of Infection  Some postoperative pain and discomfort is normal. However, if you experience any of the following, you may be developing an infection and should be seen by your doctor: pain that does not get better with the oral medications listed above, fever greater than 100.4F, foul smelling discharge coming from the surgical site, nausea and vomiting that does not stop (especially if you are unable to tolerate oral intake), or inability to urinate. If you experience any of these symptoms, call your doctor's office to be seen right away.    Follow Up  The clinic will contact you to schedule a postoperative appointment in 2 weeks. The results from the procedure will be discussed with you at that time.    [facility]  [address] [place] [id number]  Ambulatory Care Unit  Oncology Building, Level C  [phone]

## 2025-06-05 NOTE — H&P ADULT - NSHPPHYSICALEXAM_GEN_ALL_CORE
Vital Signs Last 24 Hrs  T(C): 36.8 (05 Jun 2025 13:28), Max: 36.8 (05 Jun 2025 13:28)  T(F): 98.2 (05 Jun 2025 13:28), Max: 98.2 (05 Jun 2025 13:28)  HR: 92 (05 Jun 2025 10:52) (92 - 92)  BP: 129/73 (05 Jun 2025 10:52) (129/73 - 129/73)  BP(mean): --  RR: 18 (05 Jun 2025 10:52) (18 - 18)  SpO2: 100% (05 Jun 2025 10:52) (100% - 100%)    Parameters below as of 05 Jun 2025 10:52  Patient On (Oxygen Delivery Method): room air        Physical Exam:   General: sitting comfortably in bed, NAD   CV: clinically well perfused  Lungs: respiring comfortably on RA  Back: No CVA tenderness  Abd: Soft, non-distended, mild tenderness to palpation to RLQ, LLQ non-tender, no rebound or guarding   : Scant spotting on underlying pantyliner. External labia wnl. Uterus wnl, nontender. Tenderness to palpation over R adnexa without rebound or guarding, no palpable adnexal masses. No CMT. Cervix closed.   Speculum Exam: No active bleeding from os. Physiologic discharge.    Ext: non-tender b/l, no edema   Chaperone: Viki Lloyd PA-C

## 2025-06-05 NOTE — CONSULT NOTE ADULT - SUBJECTIVE AND OBJECTIVE BOX
Gyn Consult Note  TAMIKO JORGENSEN  24y  Female [id number]    HPI: 24y  at 5w0d gestation by LMP  presenting to ED from outpatient GYN office due to concern for ectopic pregnancy. Pt reports she went to her GYN yesterday due to a positive pregnancy test; at that visit pt reports her serum bHCG was 10,335 and bedside ultrasound demonstrated no IUP and concern for ectopic pregnancy (records not available). Pt reports for the last week or so she has had intermittent RLQ pain that she describes as stabbing. Pt rates approx 1w ago pain was 10/10 and pt could not get out of bed. Pt reports pain earlier today was 7/10, after receiving IV Tylenol in ED pt reports no pain presently. Pt denies taking pain medications at home. Pt denies fevers, chills, n/v, decreased appetite, inability to tolerate PO, abnormal vaginal discharge, dysuria, dizziness, lightheadedness, syncope. Pt reports this is an undesired pregnancy.    bHCG (): 9,174    Name of GYN Physician: Dr. Liang Brown (not [facility])    OBHx: TOP w/ D+C x1    GYNHx: Denies fibroids, cysts, endometriosis, STI's, Abnormal pap smears   PMH: anxiety/depression on meds  PSH: strabismus sx, D+C x1  Meds: Sertraline  NKDA    Vital Signs Last 24 Hrs  T(C): 36.8 (2025 13:28), Max: 36.8 (2025 13:28)  T(F): 98.2 (2025 13:28), Max: 98.2 (2025 13:28)  HR: 92 (2025 10:52) (92 - 92)  BP: 129/73 (2025 10:52) (129/73 - 129/73)  BP(mean): --  RR: 18 (2025 10:52) (18 - 18)  SpO2: 100% (2025 10:52) (100% - 100%)    Parameters below as of 2025 10:52  Patient On (Oxygen Delivery Method): room air        Physical Exam:   General: sitting comfortably in bed, NAD   CV: clinically well perfused  Lungs: respiring comfortably on RA  Back: No CVA tenderness  Abd: Soft, non-distended, mild tenderness to palpation to RLQ, LLQ non-tender, no rebound or guarding   : Scant spotting on underlying pantyliner. External labia wnl. Uterus wnl, nontender. Tenderness to palpation over R adnexa without rebound or guarding, no palpable adnexal masses. No CMT. Cervix closed.   Speculum Exam: No active bleeding from os. Physiologic discharge.    Ext: non-tender b/l, no edema     LABS:                              11.1   3.68  )-----------( 331      ( 2025 11:24 )             35.3     06-    139  |  104  |  4[L]  ----------------------------<  87  3.7   |  23  |  0.69    Ca    8.9      2025 11:24    TPro  7.6  /  Alb  4.2  /  TBili  0.9  /  DBili  x   /  AST  17  /  ALT  15  /  AlkPhos  62  06-05    PT/INR - ( 2025 11:24 )   PT: 11.7 sec;   INR: 1.01 ratio         PTT - ( 2025 11:24 )  PTT:28.1 sec  Urinalysis Basic - ( 2025 11:24 )    Color: x / Appearance: x / SG: x / pH: x  Gluc: 87 mg/dL / Ketone: x  / Bili: x / Urobili: x   Blood: x / Protein: x / Nitrite: x   Leuk Esterase: x / RBC: x / WBC x   Sq Epi: x / Non Sq Epi: x / Bacteria: x        RADIOLOGY & ADDITIONAL STUDIES:  < from: US Transvaginal, OB (25 @ 12:41) >  ACC: [id number] EXAM:  US OB TRANSVAGINAL   ORDERED BY: DEONNA FRANCOIS     PROCEDURE DATE:  2025          INTERPRETATION:  CLINICAL INFORMATION: Right-sided pain. Vaginal   bleeding. First trimester pregnancy. Rule out ectopic. Serum beta-hCG   outside is reportedly 10,335.    LMP: 2025.    Estimated Gestational Age by LMP: 3 weeks, 2 days.    COMPARISON: None available.    TECHNIQUE: Endovaginal pelvic sonogram only. Color and Spectral Doppler   was performed.    FINDINGS:  Uterus: 8.1 x 5.4 x 3.8 cm and normal in appearance.    Endometrium: 6 mm and normal in appearance. No intrauterine gestation.    Right ovary: 3.1 cm x 2.4 cm x 2.3 cm. Contains a small corpus luteal   cyst. Normal arterial and venous waveforms.  Separate from the right ovary and medially, a complex structure of 3 cm   with minimal peripheral vascularity.  Left ovary: 2.2 cm x 2.1 cm x 1.9 cm. Within normal limits. Normal   arterial and venous waveforms.    Fluid: Trace fluid in the right adnexa.    IMPRESSION:  Right tubal ectopic pregnancy.      < end of copied text >   Gyn Consult Note  TAMIKO JORGENSEN  24y  Female [id number]    HPI: 24y  at 5w0d gestation by LMP  presenting to ED from outpatient GYN office due to concern for ectopic pregnancy. Pt reports she went to her GYN yesterday due to a positive pregnancy test; at that visit pt reports her serum bHCG was 10,335 and bedside ultrasound demonstrated no IUP and concern for ectopic pregnancy (records not available). Pt reports she was told yesterday to present to the ED immediately; pt came to ED this morning. Pt reports for the last week or so she has had intermittent RLQ pain that she describes as stabbing. Pt rates approx 1w ago pain was 10/10 and pt could not get out of bed. Pt reports pain earlier today was 7/10, after receiving IV Tylenol in ED pt reports no pain presently. Pt denies taking pain medications at home. Pt denies fevers, chills, n/v, decreased appetite, inability to tolerate PO, abnormal vaginal discharge, dysuria, dizziness, lightheadedness, syncope. Pt reports this is an undesired pregnancy. Pt reports last eating at approx 7p on .    bHCG (): 9,174    [facility] Phys[facility]    OBHx: TOP w/ D+C x1    GYNHx: Denies fibroids, cysts, endometriosis, STI's, Abnormal pap smears   PMH: anxiety/depression on meds  PSH: strabismus sx, D+C x1  Meds: Sertraline  NKDA    Vital Signs Last 24 Hrs  T(C): 36.8 (2025 13:28), Max: 36.8 (2025 13:28)  T(F): 98.2 (2025 13:28), Max: 98.2 (2025 13:28)  HR: 92 (2025 10:52) (92 - 92)  BP: 129/73 (2025 10:52) (129/73 - 129/73)  BP(mean): --  RR: 18 (2025 10:52) (18 - 18)  SpO2: 100% (2025 10:52) (100% - 100%)    Parameters below as of 2025 10:52  Patient On (Oxygen Delivery Method): room air        Physical Exam:   General: sitting comfortably in bed, NAD   CV: clinically well perfused  Lungs: respiring comfortably on RA  Back: No CVA tenderness  Abd: Soft, non-distended, mild tenderness to palpation to RLQ, LLQ non-tender, no rebound or guarding   : Scant spotting on underlying pantyliner. External labia wnl. Uterus wnl, nontender. Tenderness to palpation over R adnexa without rebound or guarding, no palpable adnexal masses. No CMT. Cervix closed.   Speculum Exam: No active bleeding from os. Physiologic discharge.    Ext: non-tender b/l, no edema   Chaperone: Viki Lloyd PA-C    LABS:                              11.1   3.68  )-----------( 331      ( 2025 11:24 )             35.3     06-    139  |  104  |  4[L]  ----------------------------<  87  3.7   |  23  |  0.69    Ca    8.9      2025 11:24    TPro  7.6  /  Alb  4.2  /  TBili  0.9  /  DBili  x   /  AST  17  /  ALT  15  /  AlkPhos  62  06-05    PT/INR - ( 2025 11:24 )   PT: 11.7 sec;   INR: 1.01 ratio         PTT - ( 2025 11:24 )  PTT:28.1 sec  Urinalysis Basic - ( 2025 11:24 )    Color: x / Appearance: x / SG: x / pH: x  Gluc: 87 mg/dL / Ketone: x  / Bili: x / Urobili: x   Blood: x / Protein: x / Nitrite: x   Leuk Esterase: x / RBC: x / WBC x   Sq Epi: x / Non Sq Epi: x / Bacteria: x        RADIOLOGY & ADDITIONAL STUDIES:  < from: US Transvaginal, OB (25 @ 12:41) >  ACC: [id number] EXAM:  US OB TRANSVAGINAL   ORDERED BY: DEONNA FRANCOIS     PROCEDURE DATE:  2025          INTERPRETATION:  CLINICAL INFORMATION: Right-sided pain. Vaginal   bleeding. First trimester pregnancy. Rule out ectopic. Serum beta-hCG   outside is reportedly 10,335.    LMP: 2025.    Estimated Gestational Age by LMP: 3 weeks, 2 days.    COMPARISON: None available.    TECHNIQUE: Endovaginal pelvic sonogram only. Color and Spectral Doppler   was performed.    FINDINGS:  Uterus: 8.1 x 5.4 x 3.8 cm and normal in appearance.    Endometrium: 6 mm and normal in appearance. No intrauterine gestation.    Right ovary: 3.1 cm x 2.4 cm x 2.3 cm. Contains a small corpus luteal   cyst. Normal arterial and venous waveforms.  Separate from the right ovary and medially, a complex structure of 3 cm   with minimal peripheral vascularity.  Left ovary: 2.2 cm x 2.1 cm x 1.9 cm. Within normal limits. Normal   arterial and venous waveforms.    Fluid: Trace fluid in the right adnexa.    IMPRESSION:  Right tubal ectopic pregnancy.      < end of copied text >

## 2025-06-05 NOTE — H&P ADULT - HISTORY OF PRESENT ILLNESS
Gyn Consult Note  TAMIKO JORGENSEN  24y  Female [id number]    HPI: 24y  at 5w0d gestation by LMP  presenting to ED from outpatient GYN office due to concern for ectopic pregnancy. Pt reports she went to her GYN yesterday due to a positive pregnancy test; at that visit pt reports her serum bHCG was 10,335 and bedside ultrasound demonstrated no IUP and concern for ectopic pregnancy (records not available). Pt reports she was told yesterday to present to the ED immediately; pt came to ED this morning. Pt reports for the last week or so she has had intermittent RLQ pain that she describes as stabbing. Pt rates approx 1w ago pain was 10/10 and pt could not get out of bed. Pt reports pain earlier today was 7/10, after receiving IV Tylenol in ED pt reports no pain presently. Pt denies taking pain medications at home. Pt denies fevers, chills, n/v, decreased appetite, inability to tolerate PO, abnormal vaginal discharge, dysuria, dizziness, lightheadedness, syncope. Pt reports this is an undesired pregnancy. Pt reports last eating at approx 7p on .    bHCG (): 9,174    Name of GYN Physician: Dr. Liang Brown (not [facility])    OBHx: TOP w/ D+C x1    GYNHx: Denies fibroids, cysts, endometriosis, STI's, Abnormal pap smears   PMH: anxiety/depression on meds  PSH: strabismus sx, D+C x1  Meds: Sertraline  NKDA

## 2025-06-05 NOTE — ED PROVIDER NOTE - CLINICAL SUMMARY MEDICAL DECISION MAKING FREE TEXT BOX
Afebrile hemodynamically stable female presents to ED for concerns of ectopic pregnancy which outpatient hCG 10,235 and transvaginal US with no visible IUP.  Physical exam notable for clear breath sounds bilaterally satchel on RA.  Soft nondistended diffusely tender abdomen localized to the RLQ.  Negative CVA.  Ordered basic labs, type and screen, hCG, lipase, US transvaginal to rule out ectopic versus viability of pregnancy versus UTI versus infectious urology.  Given pain meds, reassess.

## 2025-06-05 NOTE — H&P ADULT - NSHPLABSRESULTS_GEN_ALL_CORE
LABS:               11.1   3.68  )-----------( 331      ( 05 Jun 2025 11:24 )             35.3     06-05    139  |  104  |  4[L]  ----------------------------<  87  3.7   |  23  |  0.69    Ca    8.9      05 Jun 2025 11:24    TPro  7.6  /  Alb  4.2  /  TBili  0.9  /  DBili  x   /  AST  17  /  ALT  15  /  AlkPhos  62  06-05    PT/INR - ( 05 Jun 2025 11:24 )   PT: 11.7 sec;   INR: 1.01 ratio         PTT - ( 05 Jun 2025 11:24 )  PTT:28.1 sec  Urinalysis Basic - ( 05 Jun 2025 11:24 )    Color: x / Appearance: x / SG: x / pH: x  Gluc: 87 mg/dL / Ketone: x  / Bili: x / Urobili: x   Blood: x / Protein: x / Nitrite: x   Leuk Esterase: x / RBC: x / WBC x   Sq Epi: x / Non Sq Epi: x / Bacteria: x        RADIOLOGY & ADDITIONAL STUDIES:  < from: US Transvaginal, OB (06.05.25 @ 12:41) >  ACC: [id number] EXAM:  US OB TRANSVAGINAL   ORDERED BY: DEONNA FRANCOIS     PROCEDURE DATE:  06/05/2025          INTERPRETATION:  CLINICAL INFORMATION: Right-sided pain. Vaginal   bleeding. First trimester pregnancy. Rule out ectopic. Serum beta-hCG   outside is reportedly 10,335.    LMP: 5/13/2025.    Estimated Gestational Age by LMP: 3 weeks, 2 days.    COMPARISON: None available.    TECHNIQUE: Endovaginal pelvic sonogram only. Color and Spectral Doppler   was performed.    FINDINGS:  Uterus: 8.1 x 5.4 x 3.8 cm and normal in appearance.    Endometrium: 6 mm and normal in appearance. No intrauterine gestation.    Right ovary: 3.1 cm x 2.4 cm x 2.3 cm. Contains a small corpus luteal   cyst. Normal arterial and venous waveforms.  Separate from the right ovary and medially, a complex structure of 3 cm   with minimal peripheral vascularity.  Left ovary: 2.2 cm x 2.1 cm x 1.9 cm. Within normal limits. Normal   arterial and venous waveforms.    Fluid: Trace fluid in the right adnexa.    IMPRESSION:  Right tubal ectopic pregnancy.      < end of copied text >

## 2025-06-05 NOTE — ASU DISCHARGE PLAN (ADULT/PEDIATRIC) - FINANCIAL ASSISTANCE
[facility] provides services at a reduced cost to those who are determined to be eligible through [facility]’s financial assistance program. Information regarding [facility]’s financial assistance program can be found by going to [email or web address] or by calling [phone].

## 2025-06-05 NOTE — H&P ADULT - ASSESSMENT
A/P: 24y  at 5w0d gestation by LMP  presenting to ED from outpatient GYN office due to concern for ectopic pregnancy. Pt afebrile and hemodynamically stable. H/H 11.1/35.3. bHCG 9174 (). Abdominal exam notable for RLQ tenderness without rebound or guarding, no vaginal bleeding noted on exam. TVUS demonstrating no IUP, complex structure separate from R ovary in R adnexa 3cm with minimal peripheral vascularity c/w right tubal ectopic pregnancy, trace fluid in R adnexa. Clinical presentation overall consistent with ectopic pregnancy. Given significantly elevated serum bHCG, 3cm R adnexal structure with trace fluid in adnexa, tenderness to palpation on abdominal and bimanual exams to RLQ, and history of unreliable follow-up, strongly recommended proceeding to OR for surgical management. Medical management via Methotrexate offered as alternate option if pt declining surgery. Pt amenable to proceed with surgery at this time.    -Pt admitted to GYN service and added on to OR emergently for Dx LSC, poss RS/LS, poss RSO/LSO, poss laparotomy, and all other indicated procedures  -NPO, LR@125cc/h    Seen and d/w Dr. Miller, GYN service attending  [facility] PGY3

## 2025-06-05 NOTE — ASU DISCHARGE PLAN (ADULT/PEDIATRIC) - PROCEDURE
Dilation and vacuum curettage, diagnostic laparoscopy, laparoscopic right salpingectomy for removal of ectopic pregnancy

## 2025-06-05 NOTE — CONSULT NOTE ADULT - ASSESSMENT
A/P: 24y  at 5w0d gestation by LMP  presenting to ED from outpatient GYN office due to concern for ectopic pregnancy. Pt afebrile and hemodynamically stable. H/H 11.1/35.3. Abdominal exam notable for RLQ tenderness without rebound or guarding, no vaginal bleeding noted on exam. TVUS demonstrating no IUP, complex structure separate from R ovary in R adnexa 3cm with minimal peripheral vascularity c/w right tubal ectopic pregnancy, trace fluid in R adnexa. Clinical presentation overall consistent with ectopic pregnancy.    Recs pending d/w attdg  [facility] PGY3 A/P: 24y  at 5w0d gestation by LMP  presenting to ED from outpatient GYN office due to concern for ectopic pregnancy. Pt afebrile and hemodynamically stable. H/H 11.1/35.3. bHCG 9174 (). Abdominal exam notable for RLQ tenderness without rebound or guarding, no vaginal bleeding noted on exam. TVUS demonstrating no IUP, complex structure separate from R ovary in R adnexa 3cm with minimal peripheral vascularity c/w right tubal ectopic pregnancy, trace fluid in R adnexa. Clinical presentation overall consistent with ectopic pregnancy. Given significantly elevated serum bHCG, 3cm R adnexal structure with trace fluid in adnexa, tenderness to palpation on abdominal and bimanual exams to RLQ, and history of unreliable follow-up, strongly recommended proceeding to OR for surgical management. Medical management via Methotrexate offered as alternate option if pt declining surgery. Pt amenable to proceed with surgery at this time.    -Pt admitted to GYN service and added on to OR emergently for Dx LSC, poss RS/LS, poss RSO/LSO, poss laparotomy, and all other indicated procedures  -NPO, LR@125cc/h  -See H&P for more details    Seen and d/w Dr. Miller, GYN service attending  [facility] PGY3

## 2025-06-05 NOTE — ED PROVIDER NOTE - OBJECTIVE STATEMENT
24-year-old female G2, P0, PMH anxiety/depression on SSRI presents to ED for concerns of ectopic pregnancy.  Sent in by Dr. Youngblood, OB/GYN for further evaluation given outpatient labs significant for hCG 10,335 and transvaginal US with no visible IUP.  Denies fever, chills, nausea, vomiting, chest pain, SOB, hematuria, dysuria, urinary urgency, change in bowel movement.  No PSH.  Denies use of pain medication for symptomatic relief.  LMP 5/13/2025.

## 2025-06-05 NOTE — BRIEF OPERATIVE NOTE - NSICDXBRIEFPROCEDURE_GEN_ALL_CORE_FT
PROCEDURES:  Exam under anesthesia, pelvic 05-Jun-2025 20:10:27  Ng, Yumiko  Laparoscopy with dilation and curettage of uterus using suction 05-Jun-2025 20:11:40  Ng, Yumiko  Laparoscopic salpingectomy 05-Jun-2025 20:17:15 Right, for ectopic pregnancy Ng, Yumiko

## 2025-06-05 NOTE — ASU DISCHARGE PLAN (ADULT/PEDIATRIC) - FOLLOW UP APPOINTMENTS
168 may also call Recovery Room (PACU) 24/7 @ [phone]/[facility]

## 2025-06-05 NOTE — ED ADULT NURSE NOTE - OBJECTIVE STATEMENT
Received patient in Intake 3 sent by OB-GYN c/o ectopic pregnancy. Patient reports vaginal bleeding, lower abdominal pain. HX Anxiety, Depression. Patient denies SOB, chest pain, fever. Patient is A&OX4, ambulatory, airway patent, breathing unlabored and even, radial pulses palpable. Labs obtained, 20G IV placed on left arm, medications given, awaiting US. Side rails up and safety maintained. Call bells within reach. Family at the bedside.

## 2025-06-05 NOTE — ED PROVIDER NOTE - PHYSICAL EXAMINATION
Gen: NAD, non-toxic appearing  Head: normal appearing  HEENT: normal conjunctiva, oral mucosa dry   Lung: no respiratory distress, speaking in full sentences, CTA b/l     CV: regular rate and rhythm, no murmurs  Abd: Soft nondistended diffusely tender abdomen localized to the RLQ.  Negative CVA.    MSK: no visible deformities  Neuro: No focal deficits, AAOx3  Skin: Warm  Psych: normal affect

## 2025-06-05 NOTE — ASU DISCHARGE PLAN (ADULT/PEDIATRIC) - CARE PROVIDER_API CALL
[facility],   [facility]  Oncology [facility], [place] [address]  [phone]  Phone: [phone]  Fax: (   )    -  Follow Up Time: 2 weeks

## 2025-06-05 NOTE — ED PROVIDER NOTE - ATTENDING CONTRIBUTION TO CARE
Patient evaluated on visit day upon arrival to room 4 24F G2 PO PTED from PMD (Dr Osorio office) for RLQT and light vaginal bleeding. a BHCG >56331  and a PUL on outpt sono No weakness dizziness palpitations lightheadeness or episodes of syncope/near syncope   VSS  PE as documented  IMP/Plan   Patient TBA for ectopic pregnancy after presenting clinically well from [facility] office for PUL; Preops and analgesics provided; labs non actionable but TVUS Sono here + for   preops sent GYN aware will continue to assess; Patient  US FINDINGS: No intrauterine gestation. A right ovary measuring 3.1 cm x 2.4 cm x 2.3 cm.with a small corpus luteal cyst. & normal arterial and venous waveforms.but also a  3 structure  and medially located  from the Left ovary w/ minimal peripheral vascularity c/w a right tubal ectopic pregnancy. Free fluid noted   Will continue to assess

## 2025-06-05 NOTE — ASU DISCHARGE PLAN (ADULT/PEDIATRIC) - PROVIDER TOKENS
FREE:[LAST:[[facility]],PHONE:[phone]],FAX:[(   [phone]    -],ADDRESS:[[facility]  Oncology [facility], [place] [address]  [phone]],FOLLOWUP:[2 weeks]]

## 2025-06-05 NOTE — H&P ADULT - ATTENDING COMMENTS
23 y/o P0 LMP 5/1/25 sent by primary GYN with concern for ectopic pregnancy  Patient sent to ER yesterday but did not come in until today  Seen and examined at bedside, resting comfortably in no acute distress  Mild tenderness in RLQ, patient reports significant pain last week, but today pain has resolved with IV tylenol  Imaging reviewed showing concern for 3cm right tubal ectopic pregnancy with trace complex free fluid in the adnexa. bHCG >9000, otherwise vital signs and H/H stable  Management options reviewed with patient. In the setting of possible ruptured ectopic pregnancy, high bHCG, unreliability of patient and tenderness on exam, recommend laparoscopy and right salpingectomy. Initially patient desiring medical management, discussed high risk of failure with high bHCG and concern for loss of follow up. After discussion with her family, patient agrees to surgery  Risks of surgery reviewed with patient, including bleeding, infection, injury to surrounding structures, need for laparotomy. Patient voices understanding, all questions answered, consent signed and witnessed  Patient added to OR emergently for suspected ruptured right ectopic pregnancy    Amrit Miller MD  Covering GYN [facility] Attending

## 2025-06-05 NOTE — BRIEF OPERATIVE NOTE - OPERATION/FINDINGS
Exam under anesthesia revealed normal external genitalia. 6w anteverted uterus. Vagina and cervix appeared normal.     Intraabdominal findings include atraumatic site of entry. Right fallopian tube distended with 2-3cm mass, consistent with ectopic pregnancy. 20cc hemoperitoneum and blood clots in the posterior cul de sac and left ovarian fossa. Grossly normal bilateral ovaries. Left fallopian tubes normal in appearance. Uterus non-enlarged and smooth with no exophytic masses. Normal upper abdominal survey with smooth liver edge. The omentum was normal in appearance.

## 2025-06-06 ENCOUNTER — TRANSCRIPTION ENCOUNTER (OUTPATIENT)
Age: 25
End: 2025-06-06

## 2025-06-09 RX ORDER — IBUPROFEN 200 MG
1 TABLET ORAL
Qty: 0 | Refills: 0 | DISCHARGE

## 2025-06-09 RX ORDER — ACETAMINOPHEN 500 MG/5ML
3 LIQUID (ML) ORAL
Qty: 0 | Refills: 0 | DISCHARGE

## 2025-06-11 LAB — SURGICAL PATHOLOGY STUDY: SIGNIFICANT CHANGE UP

## 2025-06-18 ENCOUNTER — APPOINTMENT (OUTPATIENT)
Dept: OBGYN | Facility: HOSPITAL | Age: 25
End: 2025-06-18

## 2025-06-27 NOTE — CHART NOTE - NSCHARTNOTEFT_GEN_A_CORE
Patient is s/p EUA, D&C, LSC R Salpingectomy for ectopic pregnancy on 6/5.  She was scheduled for a postoperative appointment with Gyn Clinic on 6/18, however was a no-show for that appointment.  Pathology results from 6/5 surgery show hypersecretory endometrium and ectopic pregnancy.  Inpatient gyn team attempted to call patient on 6/25 and 6/27 to follow up on patient's recovery and discuss pathology results.  However on both dates there was no answer at patient's provided number and unable to leave voicemail.      REUBEN Yung PGY3
R2 OBGYN POST-OP CHECK    S: Patient seen and evaluated at bedside.  Pt awake and alert resting comfortably in bed vs.  Pt sleeping, easily arousable  Patient reports pain controlled with analgesia. Pt denies N/V, SOB, CP, palpitations, fever/chills. Tolerating clears.  OOB             Gen: Resting comfortably in bed, NAD  Lungs: non labored breathing on RA   Abd: soft, appropriately tender,   Inc: lsc incision Clean/dry/intact w/ bandage in place  Ext: SCD's in place and functional, non-tender b/l, no edema        A/P: 24y Female s/p DVC, lsc R salpingectomy (EBL 10). Recovering well postoperative.     Neuro: PO Tylenol , motrin oxy prn   CV: Hemodynamically stable.  Monitor VS.    Pulm: Saturating well on room air.  Encourage OOB and incentive spirometer use.   GI: Advance to regular diet. Anti-emetics PRN.  : voiding spontaneously   FEN: Electrolytes: LR@125cc/hr.   Heme: DVT ppx w/ SCD's while in bed. Early ambulation, initially with assistance then as tolerated.    ID: Afebrile  Endo: No active issues   Dispo: Discharge from PACU when criteria met.       Elaine Whitehead, PGY-2

## (undated) DEVICE — TROCAR COVIDIEN VERSAONE FIXATION CANNULA 5MM

## (undated) DEVICE — APPLICATOR VISTASEAL LAP DUAL 35CM RIGID

## (undated) DEVICE — TUBING STRYKEFLOW II SUCTION / IRRIGATOR

## (undated) DEVICE — TROCAR COVIDIEN VERSAPORT BLADELESS OPTICAL 5MM STANDARD

## (undated) DEVICE — D HELP - CLEARVIEW CLEARIFY SYSTEM

## (undated) DEVICE — ENDOCATCH 10MM

## (undated) DEVICE — LIGASURE BLUNT TIP 5MM X 37CM